# Patient Record
Sex: FEMALE | Race: WHITE | HISPANIC OR LATINO
[De-identification: names, ages, dates, MRNs, and addresses within clinical notes are randomized per-mention and may not be internally consistent; named-entity substitution may affect disease eponyms.]

---

## 2021-04-05 ENCOUNTER — APPOINTMENT (OUTPATIENT)
Dept: DISASTER EMERGENCY | Facility: OTHER | Age: 61
End: 2021-04-05
Payer: MEDICAID

## 2021-04-05 PROCEDURE — 0001A: CPT

## 2021-04-23 ENCOUNTER — EMERGENCY (EMERGENCY)
Facility: HOSPITAL | Age: 61
LOS: 1 days | Discharge: HOME | End: 2021-04-23
Attending: EMERGENCY MEDICINE
Payer: MEDICAID

## 2021-04-23 VITALS
SYSTOLIC BLOOD PRESSURE: 129 MMHG | TEMPERATURE: 98 F | RESPIRATION RATE: 16 BRPM | WEIGHT: 160.06 LBS | HEART RATE: 87 BPM | OXYGEN SATURATION: 98 % | DIASTOLIC BLOOD PRESSURE: 76 MMHG

## 2021-04-23 PROCEDURE — 96372 THER/PROPH/DIAG INJ SC/IM: CPT

## 2021-04-23 PROCEDURE — 99284 EMERGENCY DEPT VISIT MOD MDM: CPT

## 2021-04-23 PROCEDURE — 99284 EMERGENCY DEPT VISIT MOD MDM: CPT | Mod: 25

## 2021-04-23 RX ORDER — ACETAMINOPHEN 500 MG
650 TABLET ORAL ONCE
Refills: 0 | Status: COMPLETED | OUTPATIENT
Start: 2021-04-23 | End: 2021-04-23

## 2021-04-23 RX ORDER — KETOROLAC TROMETHAMINE 30 MG/ML
30 SYRINGE (ML) INJECTION ONCE
Refills: 0 | Status: DISCONTINUED | OUTPATIENT
Start: 2021-04-23 | End: 2021-04-23

## 2021-04-23 RX ORDER — LIDOCAINE 4 G/100G
1 CREAM TOPICAL ONCE
Refills: 0 | Status: COMPLETED | OUTPATIENT
Start: 2021-04-23 | End: 2021-04-23

## 2021-04-23 RX ADMIN — Medication 650 MILLIGRAM(S): at 12:55

## 2021-04-23 RX ADMIN — Medication 30 MILLIGRAM(S): at 12:03

## 2021-04-23 RX ADMIN — Medication 650 MILLIGRAM(S): at 12:03

## 2021-04-23 RX ADMIN — Medication 30 MILLIGRAM(S): at 12:55

## 2021-04-23 RX ADMIN — LIDOCAINE 1 PATCH: 4 CREAM TOPICAL at 12:03

## 2021-04-23 NOTE — ED PROVIDER NOTE - OBJECTIVE STATEMENT
61 y/o F patient with a significant PMHx of ankle fracture presents to the ED with c/o lower back pain 1w. Patient states ever since the ankle was hurt, patient has been feeling pain along his back side. Patient denies any new trauma, saddle anesthesia, fever, or any other complaints. Pt reports having shooting pain down the right leg.

## 2021-04-23 NOTE — ED PROVIDER NOTE - NS ED ATTENDING STATEMENT MOD
Provider applying aluminum finger splint to right 3rd finger. Discharged by provider with school note.
Attending Only

## 2021-04-23 NOTE — ED PROVIDER NOTE - PATIENT PORTAL LINK FT
You can access the FollowMyHealth Patient Portal offered by [facility] by registering at the following website: [email or web address]. By joining [facility] FollowMyHealth portal, you will also be able to view your health information using other applications (apps) compatible with our system.

## 2021-04-23 NOTE — ED PROVIDER NOTE - MUSCULOSKELETAL, MLM
Spine appears normal, range of motion is not limited, no muscle or joint tenderness, positive straight leg raise

## 2021-04-26 ENCOUNTER — APPOINTMENT (OUTPATIENT)
Dept: DISASTER EMERGENCY | Facility: OTHER | Age: 61
End: 2021-04-26
Payer: MEDICAID

## 2021-04-26 PROCEDURE — 0002A: CPT
